# Patient Record
Sex: FEMALE | Race: WHITE
[De-identification: names, ages, dates, MRNs, and addresses within clinical notes are randomized per-mention and may not be internally consistent; named-entity substitution may affect disease eponyms.]

---

## 2017-02-20 ENCOUNTER — HOSPITAL ENCOUNTER (OUTPATIENT)
Dept: HOSPITAL 45 - C.LABBC | Age: 67
Discharge: HOME | End: 2017-02-20
Attending: INTERNAL MEDICINE
Payer: COMMERCIAL

## 2017-02-20 DIAGNOSIS — R73.01: ICD-10-CM

## 2017-02-20 DIAGNOSIS — Z51.81: Primary | ICD-10-CM

## 2017-02-20 DIAGNOSIS — Z79.01: ICD-10-CM

## 2017-02-20 LAB
ALBUMIN/GLOB SERPL: 0.9 {RATIO} (ref 0.9–2)
ALP SERPL-CCNC: 59 U/L (ref 45–117)
ALT SERPL-CCNC: 26 U/L (ref 12–78)
ANION GAP SERPL CALC-SCNC: 8 MMOL/L (ref 3–11)
AST SERPL-CCNC: 16 U/L (ref 15–37)
BUN SERPL-MCNC: 21 MG/DL (ref 7–18)
BUN/CREAT SERPL: 34 (ref 10–20)
CALCIUM SERPL-MCNC: 9 MG/DL (ref 8.5–10.1)
CHLORIDE SERPL-SCNC: 105 MMOL/L (ref 98–107)
CHOLEST/HDLC SERPL: 2.3 {RATIO}
CO2 SERPL-SCNC: 29 MMOL/L (ref 21–32)
CREAT SERPL-MCNC: 0.63 MG/DL (ref 0.6–1.2)
EST. AVERAGE GLUCOSE BLD GHB EST-MCNC: 120 MG/DL
GLOBULIN SER-MCNC: 3.9 GM/DL (ref 2.5–4)
GLUCOSE SERPL-MCNC: 89 MG/DL (ref 70–99)
GLUCOSE UR QL: 77 MG/DL
KETONES UR QL STRIP: 87 MG/DL
NITRITE UR QL STRIP: 77 MG/DL (ref 0–150)
PH UR: 179 MG/DL (ref 0–200)
POTASSIUM SERPL-SCNC: 4.3 MMOL/L (ref 3.5–5.1)
SODIUM SERPL-SCNC: 142 MMOL/L (ref 136–145)
TSH SERPL-ACNC: 2.43 UIU/ML (ref 0.3–4.5)
VERY LOW DENSITY LIPOPROT CALC: 15 MG/DL

## 2017-02-22 ENCOUNTER — HOSPITAL ENCOUNTER (OUTPATIENT)
Dept: HOSPITAL 45 - C.PAPS | Age: 67
Discharge: HOME | End: 2017-02-22
Attending: INTERNAL MEDICINE
Payer: COMMERCIAL

## 2017-02-22 DIAGNOSIS — Z01.419: Primary | ICD-10-CM

## 2017-02-22 DIAGNOSIS — N95.2: ICD-10-CM

## 2017-02-24 NOTE — CODING QUERY MEDICAL NECESSITY
SUPPORTING DIAGNOSIS NEEDED



Dr. Arellano,



A supporting diagnosis is required for the test/procedure performed on this patient in 
order for us to be reimbursed by the patient's insurance. Please provide a supporting 
diagnosis for the following test/procedure listed below next to the test name along with 
your signature. 



*If there is no additional diagnosis for this patient that would support the following 
test/procedure please document that below next to the test/procedure.



Test(s)/Procedure(s) that require a supporting diagnosis:





* [id number] GLYCATED HEMOGLOBIN            DIAGNOSIS:





***DATE OF SERVICE: 2/20/17***





Provider Signature:  ______________________________  Date:  _______



Thank you  

Jose Mendoza

[facility] Information Management

Phone:  [phone]

Fax:  [phone]



Once completed, please kindly fax back to [phone]



For questions please call [phone]

## 2017-03-02 ENCOUNTER — HOSPITAL ENCOUNTER (OUTPATIENT)
Dept: HOSPITAL 45 - C.LAB | Age: 67
Discharge: HOME | End: 2017-03-02
Attending: INTERNAL MEDICINE
Payer: COMMERCIAL

## 2017-03-02 DIAGNOSIS — Z11.59: Primary | ICD-10-CM

## 2017-03-09 ENCOUNTER — HOSPITAL ENCOUNTER (OUTPATIENT)
Dept: HOSPITAL 45 - C.MAMM | Age: 67
Discharge: HOME | End: 2017-03-09
Attending: INTERNAL MEDICINE
Payer: COMMERCIAL

## 2017-03-09 DIAGNOSIS — M85.851: ICD-10-CM

## 2017-03-09 DIAGNOSIS — Z00.00: Primary | ICD-10-CM

## 2017-06-07 ENCOUNTER — HOSPITAL ENCOUNTER (OUTPATIENT)
Dept: HOSPITAL 45 - C.RADBC | Age: 67
Discharge: HOME | End: 2017-06-07
Attending: INTERNAL MEDICINE
Payer: COMMERCIAL

## 2017-06-07 DIAGNOSIS — J20.9: Primary | ICD-10-CM

## 2017-06-07 NOTE — DIAGNOSTIC IMAGING REPORT
TWO VIEW CHEST



CLINICAL HISTORY: Acute bronchitis.



FINDINGS: PA and lateral chest radiographs are compared to study dated

11/13/2013. The cardiomediastinal silhouette is unremarkable.  There is

atherosclerotic calcification of the thoracic aorta. There is fullness of the

right hilum. Chronic interstitial thickening is unchanged. No airspace

consolidation or pleural effusion is identified. There is no pneumothorax. The

skeletal structures are osteopenic. Degenerative change and mild scoliosis are

noted in the thoracic spine.



IMPRESSION: 



1. There is no airspace consolidation or pleural effusion.



2. There is fullness of the right hilum. This likely represents a prominent

pulmonary artery. Correlation with a contrast-enhanced chest CT is recommended

for further interrogation and to rule out underlying mass or lymphadenopathy.







Electronically signed by:  Omer Maldonado M.D.

6/7/2017 11:35 AM



Dictated Date/Time:  6/7/2017 11:29 AM

## 2017-06-09 ENCOUNTER — HOSPITAL ENCOUNTER (OUTPATIENT)
Dept: HOSPITAL 45 - C.CTS | Age: 67
Discharge: HOME | End: 2017-06-09
Attending: PHYSICIAN ASSISTANT
Payer: COMMERCIAL

## 2017-06-09 DIAGNOSIS — R59.0: ICD-10-CM

## 2017-06-09 DIAGNOSIS — R91.8: Primary | ICD-10-CM

## 2017-06-09 NOTE — DIAGNOSTIC IMAGING REPORT
CT SCAN OF THE CHEST WITH IV CONTRAST



CLINICAL HISTORY: Follow-up abnormal chest x-ray. Fullness of the right hilum.



COMPARISON STUDY:  Chest x-ray dated 6/7/2017.



TECHNIQUE: Following the IV administration of 93 cc of Optiray 320, CT scan of

the thorax was performed from the thoracic inlet to the upper abdomen. Images

are reviewed in the axial, sagittal, and coronal planes. IV contrast was

administered without complication.



CT DOSE: 670.27 mGy.cm



FINDINGS:



Thyroid: Imaged portions of the thyroid gland are normal in size and

attenuation.



Thoracic aorta: There is mild atherosclerotic calcification of the thoracic

aorta, which is normal in caliber and demonstrates standard 3-vessel arch

anatomy. No dissection is seen.



Pulmonary vasculature: The pulmonary trunk is normal in caliber. There are no

filling defects identified in the central pulmonary vessels to indicate

pulmonary embolus. Note that this examination was not protocoled for evaluation

of the pulmonary arteries.



Heart: The heart is enlarged and without pericardial effusion.



Lungs and pleural spaces: There is patchy groundglass consolidation identified

in the superior segment of the right lower lobe. This likely represents a mild

infectious/inflammatory pneumonitis. No pleural effusion is identified. The

lungs are otherwise clear. The trachea and central airways are patent mild

diffuse peribronchial thickening is observed.



Mediastinum: There are prominent mediastinal lymph nodes. A precarinal node on

image #116 measures 10 mm in short axis.



Ramonita: Prominent right hilar lymph nodes measure up to 9 mm in short axis.



Axillae: There is no axillary lymphadenopathy.



Upper abdomen: There is a tiny hiatal hernia. A 1.6 cm cyst is noted in the left

lobe of liver. Additional subcentimeter hepatic hypodensities may also represent

cysts but are 2 small for definitive characterization. There is evidence of

hepatic steatosis.



Skeletal structures: The skeletal structures are osteopenic. There is a mild and

age indeterminant superior end plate compression deformity of T7. Mild

degenerative change is seen in the shoulders and thoracic spine. No lytic or

blastic bony lesions are seen.





IMPRESSION:



1. There is mild patchy groundglass consolidation identified in the superior

segment of the right lower lobe. The appearance is typical for a mild

infectious/inflammatory pneumonitis. Clinical correlation will be required.



2. Mild diffuse peribronchial thickening suggests reactive air disease.



3. Mild cardiomegaly.



4. There are prominent mediastinal and hilar lymph nodes. These are not clearly

pathologically enlarged by size criteria and are likely on a reactive basis.



5. There is no mass or concerning adenopathy identified in the right hilum as

questioned by chest x-ray.







Electronically signed by:  Omer Maldonado M.D.

6/9/2017 2:52 PM



Dictated Date/Time:  6/9/2017 2:41 PM

## 2017-08-23 ENCOUNTER — HOSPITAL ENCOUNTER (INPATIENT)
Dept: HOSPITAL 45 - C.EDA | Age: 67
LOS: 1 days | Discharge: HOME | DRG: 310 | End: 2017-08-24
Attending: HOSPITALIST | Admitting: HOSPITALIST
Payer: COMMERCIAL

## 2017-08-23 VITALS
DIASTOLIC BLOOD PRESSURE: 69 MMHG | TEMPERATURE: 97.7 F | OXYGEN SATURATION: 93 % | HEART RATE: 56 BPM | SYSTOLIC BLOOD PRESSURE: 113 MMHG

## 2017-08-23 VITALS — SYSTOLIC BLOOD PRESSURE: 101 MMHG | HEART RATE: 102 BPM | DIASTOLIC BLOOD PRESSURE: 64 MMHG

## 2017-08-23 VITALS
DIASTOLIC BLOOD PRESSURE: 73 MMHG | HEART RATE: 88 BPM | OXYGEN SATURATION: 94 % | SYSTOLIC BLOOD PRESSURE: 116 MMHG | TEMPERATURE: 97.88 F

## 2017-08-23 VITALS
BODY MASS INDEX: 39.67 KG/M2 | WEIGHT: 238.1 LBS | WEIGHT: 238.1 LBS | HEIGHT: 65 IN | HEIGHT: 65 IN | BODY MASS INDEX: 39.67 KG/M2

## 2017-08-23 VITALS
TEMPERATURE: 97.52 F | DIASTOLIC BLOOD PRESSURE: 73 MMHG | SYSTOLIC BLOOD PRESSURE: 124 MMHG | HEART RATE: 58 BPM | OXYGEN SATURATION: 95 %

## 2017-08-23 VITALS
HEART RATE: 79 BPM | TEMPERATURE: 97.7 F | DIASTOLIC BLOOD PRESSURE: 64 MMHG | OXYGEN SATURATION: 94 % | SYSTOLIC BLOOD PRESSURE: 101 MMHG

## 2017-08-23 VITALS — OXYGEN SATURATION: 92 %

## 2017-08-23 DIAGNOSIS — I10: ICD-10-CM

## 2017-08-23 DIAGNOSIS — I48.0: Primary | ICD-10-CM

## 2017-08-23 DIAGNOSIS — Z79.899: ICD-10-CM

## 2017-08-23 DIAGNOSIS — Z82.49: ICD-10-CM

## 2017-08-23 DIAGNOSIS — F41.8: ICD-10-CM

## 2017-08-23 DIAGNOSIS — Z79.01: ICD-10-CM

## 2017-08-23 LAB
ANION GAP SERPL CALC-SCNC: 5 MMOL/L (ref 3–11)
BASOPHILS # BLD: 0.02 K/UL (ref 0–0.2)
BASOPHILS NFR BLD: 0.3 %
BUN SERPL-MCNC: 15 MG/DL (ref 7–18)
BUN/CREAT SERPL: 20.8 (ref 10–20)
CALCIUM SERPL-MCNC: 9.1 MG/DL (ref 8.5–10.1)
CHLORIDE SERPL-SCNC: 106 MMOL/L (ref 98–107)
CKMB/CK RATIO: 1.1 (ref 0–3)
CO2 SERPL-SCNC: 29 MMOL/L (ref 21–32)
COMPLETE: YES
CREAT CL PREDICTED SERPL C-G-VRATE: 90.6 ML/MIN
CREAT SERPL-MCNC: 0.74 MG/DL (ref 0.6–1.2)
EOSINOPHIL NFR BLD AUTO: 232 K/UL (ref 130–400)
GLUCOSE SERPL-MCNC: 112 MG/DL (ref 70–99)
HCT VFR BLD CALC: 44.6 % (ref 37–47)
IG%: 0.2 %
IMM GRANULOCYTES NFR BLD AUTO: 29.2 %
LYMPHOCYTES # BLD: 1.69 K/UL (ref 1.2–3.4)
MCH RBC QN AUTO: 30.8 PG (ref 25–34)
MCHC RBC AUTO-ENTMCNC: 34.1 G/DL (ref 32–36)
MCV RBC AUTO: 90.3 FL (ref 80–100)
MONOCYTES NFR BLD: 13.3 %
NEUTROPHILS # BLD AUTO: 2.2 %
NEUTROPHILS NFR BLD AUTO: 54.8 %
PMV BLD AUTO: 10.7 FL (ref 7.4–10.4)
POTASSIUM SERPL-SCNC: 3.9 MMOL/L (ref 3.5–5.1)
RBC # BLD AUTO: 4.94 M/UL (ref 4.2–5.4)
SODIUM SERPL-SCNC: 140 MMOL/L (ref 136–145)
WBC # BLD AUTO: 5.78 K/UL (ref 4.8–10.8)

## 2017-08-23 RX ADMIN — DILTIAZEM HYDROCHLORIDE PRN MLS/HR: 5 INJECTION INTRAVENOUS at 10:59

## 2017-08-23 RX ADMIN — DRONEDARONE SCH MG: 400 TABLET, FILM COATED ORAL at 19:32

## 2017-08-23 RX ADMIN — DILTIAZEM HYDROCHLORIDE PRN MLS/HR: 5 INJECTION INTRAVENOUS at 13:01

## 2017-08-23 NOTE — HISTORY & PHYSICAL EXAMINATION
DATE OF ADMISSION:  08/23/2017

 

CHIEF COMPLAINT:  Palpitations and lightheadedness.

 

HISTORY OF PRESENT ILLNESS:  The patient is a very pleasant 66-year-old

female who notes that while this almost never happened, she accidentally

forgot to take her evening meds last night.  She woke up this morning,

feeling palpitations and a little lightheadedness, which is very similar to

what she feels whenever she is in RVR.  She checked her pulse and it was up. 

She took her morning meds, hoping that that would correct this and then she 
would be

doing better.  Unfortunately, she continued to feel dizzy and palpitations. 

She notes at one point with a pulse ox, on her finger, her pulse dropped to

48 and her pulse oximetry down to 81%.  Although, she notes not really

feeling short of breath, maybe mildly so, but more just feeling more

lightheaded at that time; however, she then summoned 911 and was brought to

the ER and has been found in AFib with RVR since.  She denies any chest

pressure.  The shortness of breath is very mild at worst, certainly not

severe at all.  No fevers, chills, or sweats.  No other inciting factors

other than for getting her meds.  She notes that probably in the entirety of

her history of AFib, she has forgotten her meds 4 times, so it is not a

routine thing and generally she has been under good control.

 

REVIEW OF SYSTEMS:  Otherwise negative, except for as above.

 

PAST MEDICAL HISTORY:  Includes AFib and situational anxiety related to

taking care of her sick .  Unfortunately today is in fact her

anniversary.

 

MEDICATIONS:  Fish oil 1000 mg daily, metoprolol succinate 50 mg daily,

Multaq 400 mg b.i.d., probiotic daily, Zoloft 50 mg daily, B12 at 1000 mcg

daily, vitamin C 1000 mg daily, and Xarelto 20 mg daily.

 

PAST SURGICAL HISTORY:  Includes Bartholin gland cyst excision,

tonsillectomy, adenoidectomy and a tubal.

 

FAMILY HISTORY:  Most significant for AFib in mom and sister and MI in her

father.

 

SOCIAL HISTORY:  Very rare alcohol.  She is not a smoker.  She is retired,

 and cares for her .

 

ALLERGIES:  No known drug allergies.

 

PHYSICAL EXAMINATION:

VITAL SIGNS:  Her initial vitals showed a temp of 36.7, pulse in the 109-110

range, respiratory rate 16, blood pressure 151/121, and 92%-94% on room air.

GENERAL:  She is awake, alert, and oriented x3, pleasant, slightly fatigued

appearing, but otherwise in no acute distress.

HEENT:  Normocephalic and atraumatic.  Mucous membranes are moist.

CARDIOVASCULAR:  Tachycardic and irregularly irregular.  Although, actually

even during the time of interview and exam, her heart rate goes from the one

teens to bouncing between 80s to the low 100s.  She shows no rubs, murmurs,

or gallops.

LUNGS:  Faintly coarse at the bases.  Clear elsewhere.  No discrete rales,

rhonchi, or wheezes.  Good effort.  Good air entry.  No accessory muscle use.

ABDOMEN:  Soft, nondistended, and nontender.  No masses or organomegaly.

EXTREMITIES:  Without cyanosis, clubbing or edema.  No calf tenderness.

SKIN:  Shows no rashes.  No pallor or icterus.

NEUROLOGIC:  Shows cranial nerves II-XII to be grossly intact.  Gross motor

and sensory are intact.

MUSCULOSKELETAL:  No gross lesions.

 

LABS AND DIAGNOSTICS:  CBC shows a white count of 5.78, hemoglobin 15.2, and

platelets 232.  Basic metabolic panel with sodium 140, potassium 3.9,

chloride 106, CO2 of 29, BUN 15, creatinine 0.74, calcium 9.1, and glucose

112.  CK total of 146 with an MB of 1.6.  Troponin of less than 0.015.  TSH

of 1.5.  Chest x-ray was read as atherosclerosis of the aortic arch,

prominence of the right hilum.  Previously noted opacities in the superior

segment of the right lower lobe on chest CT from 06/09/2017 are no longer

apparent.  Lung and pleural spaces otherwise clear.  Osseous structures are

normal.  Upper abdomen normal.  To my review, there is maybe mild pulmonary

vascular prominence.  EKG shows atrial fibrillation at 119 without any

significant ischemic changes.

 

ASSESSMENT AND PLAN:

1.  Atrial fibrillation with rapid ventricular response.  This appears to be

incited by accidentally forgetting to take her meds.  Fortunately, it does

not appear that the compliance is  regular issue and her medication regimen

is not very complicated.  At this point, the ER started her on a diltiazem

drip and she is already improving rather dramatically, we plan to transition 
over

to an additional oral metoprolol at this point 12.5 t.i.d. in addition to her

regular 50 mg daily and titrate up or down as needed.  Hopefully, we will be

able to get this under control and hopefully home in the next day or so.  We

will continue her on her Multaq.  If her rates are refractory, certainly we

can utilize IV beta blockers or IV calcium channel blockers again, but it

appears she is going to improve fairly quickly.

2.  Anxiety.  Continue her Zoloft.

3.  Deep venous thrombosis prophylaxis.  She is anticoagulated with Xarelto

for atrial fibrillation.

 

 

[facility]

## 2017-08-23 NOTE — DIAGNOSTIC IMAGING REPORT
CHEST ONE VIEW PORTABLE



CLINICAL HISTORY: 66 years-old Female presenting with Chest Pain. 



TECHNIQUE: Portable upright AP view of the chest was obtained.



COMPARISON:  6/7/2017 and chest CT from 6/9/2017..



FINDINGS:

Atherosclerosis of aortic arch. Prominence of the right hilum. Previously noted

opacities in the superior segment of the right lower lobe on chest CT from

6/9/2017 are not apparent. Lungs and pleural spaces otherwise clear. Osseous

structures normal. Upper abdomen normal.



IMPRESSION:

1.  Prominence of the right hilum could correspond with hilar lymphadenopathy.



2.  Previously noted centrilobular nodular opacities in the superior segment of

the right lower lobe on chest CT from 6/9/2017 are not radiographically

apparent. No new focal infiltrate.







Electronically signed by:  Nikita Walton M.D.

8/23/2017 10:21 AM



Dictated Date/Time:  8/23/2017 10:18 AM

## 2017-08-23 NOTE — EMERGENCY ROOM VISIT NOTE
History


Report prepared by Jake:  Joselyn Farrell


Under the Supervision of:  Dr. Ben Zuniga D.O.


First contact with patient:  09:48


Stated Complaint:  AFIB





History of Present Illness


The patient is a 66 year old female who presents to the Emergency Room with 

complaints of a constant rapid heart rate since she woke up this morning about 

2.5 hours PTA. She laid in bed for an hour and a half because she felt unwell. 

She was also feeling short of breath. At 8:30 she went to take her morning 

medications and noticed that she had forgotten to take all of her medications 

last night. EMS was called and the patient was brought to the ED by ambulance. 

She was found to be in AFIB. Paramedics called for medical command and the 

patient was given 20mg of Cardizem en route. Her symptoms improved with 

Cardizem. The patient does not wear O2 at home. She is not normally in a-fib. 

Pt denies headache, change in vision, fevers, chest pain nausea, vomiting, 

diarrhea, pain with urination, and melena. She takes Xarelto. She has a history 

of AFIB and states that this is typically how she feels when she has an episode 

of AFIB.





   Source of History:  patient, EMS


   Onset:  2.5 hours PTA


   Position:  chest


   Quality:  other (rapid)


   Timing:  resolved


   Modifying Factors (Worsening):  other (medication noncompliance)


   Modifying Factors (Relieving):  other (Cardizem)


   Associated Symptoms:  + SOB, No fevers, No headache, No chest pain, No nausea

, No vomiting, No melena, No diarrhea, No urinary symptoms





Review of Systems


See HPI for pertinent positives & negatives. A total of 10 systems reviewed and 

were otherwise negative.





Past Medical & Surgical


Medical Problems:


(1) Atrial fibrillation


(2) Hypertension








Family History





Hypertension





Social History


Alcohol Use:  none


Drug Use:  none


Marital Status:  single


Housing Status:  lives with family


Occupation Status:  retired





Current/Historical Medications


Scheduled


Ascorbic Acid (Ascorbic Acid), 500 MG PO DAILY


Dronedarone Hcl (Multaq), 1 TAB PO BID


Metoprolol Succ (Toprol Xl) (Toprol-Xl), 50 MG PO DAILY


Rivaroxaban (Xarelto), 20 MG PO DAILY


Thiamine Hcl (Vitamin B-1), 50 MG PO DAILY





Miscellaneous Medications


Saccharomyces Boulardii (Probiotic)





Allergies


Coded Allergies:  


     No Known Allergies (Unverified , 12/29/14)





Physical Exam


Vital Signs











  Date Time  Temp Pulse Resp B/P (MAP) Pulse Ox O2 Delivery O2 Flow Rate FiO2


 


8/23/17 11:55     92 Room Air  


 


8/23/17 11:46  100 16 131/76    


 


8/23/17 11:18  126 20 125/72 93 Room Air  


 


8/23/17 10:52  114 16 141/90 92 Room Air  


 


8/23/17 10:20  103 16 85/66 94 Room Air  


 


8/23/17 09:58 36.7 110 20 151/121 92 Room Air  


 


8/23/17 09:57  109      











Physical Exam


GENERAL: alert, sitting up in bed, disheveled appearing, well nourished, 

minimal distress, non-toxic 


EYE EXAM: normal conjunctiva


OROPHARYNX: no exudate, no erythema, lips, buccal mucosa, and tongue normal and 

mucous membranes are moist


NECK: supple, no nuchal rigidity, no adenopathy, non-tender, no JVD


LUNGS: Clear to auscultation. Normal chest wall mechanics


HEART: Tachycardic, irregularly irregular, systolic ejection murmur, S1 normal 

and S2 normal 


ABDOMEN: abdomen soft, non-tender, normo-active bowel sounds, no masses, no 

rebound or guarding. 


BACK: Back is symmetrical on inspection and there is no deformity, no midline 

tenderness, no CVA tenderness. 


SKIN: no rashes and no bruising 


UPPER EXTREMITIES: upper extremities are grossly normal. 


LOWER EXTREMITIES: No pitting edema. Calves equal bilaterally. 


NEURO EXAM: Normal sensorium, cranial nerves II-XII grossly intact, normal 

speech,  no gross weakness of arms, no gross weakness of legs.





Medical Decision & Procedures


ER Provider


Diagnostic Interpretation:


Radiology results as stated below per my review and the radiologist's 

interpretation: 





CHEST ONE VIEW PORTABLE





CLINICAL HISTORY: 66 years-old Female presenting with Chest Pain. 





TECHNIQUE: Portable upright AP view of the chest was obtained.





COMPARISON:  6/7/2017 and chest CT from 6/9/2017..





FINDINGS:


Atherosclerosis of aortic arch. Prominence of the right hilum. Previously noted


opacities in the superior segment of the right lower lobe on chest CT from


6/9/2017 are not apparent. Lungs and pleural spaces otherwise clear. Osseous


structures normal. Upper abdomen normal.





IMPRESSION:


1.  Prominence of the right hilum could correspond with hilar lymphadenopathy.





2.  Previously noted centrilobular nodular opacities in the superior segment of


the right lower lobe on chest CT from 6/9/2017 are not radiographically


apparent. No new focal infiltrate.











Electronically signed by:  Nikita Walton M.D.


8/23/2017 10:21 AM





Dictated Date/Time:  8/23/2017 10:18 AM





Laboratory Results


8/23/17 10:15








Red Blood Count 4.94, Mean Corpuscular Volume 90.3, Mean Corpuscular Hemoglobin 

30.8, Mean Corpuscular Hemoglobin Concent 34.1, Mean Platelet Volume 10.7, 

Neutrophils (%) (Auto) 54.8, Lymphocytes (%) (Auto) 29.2, Monocytes (%) (Auto) 

13.3, Eosinophils (%) (Auto) 2.2, Basophils (%) (Auto) 0.3, Neutrophils # (Auto

) 3.16, Lymphocytes # (Auto) 1.69, Monocytes # (Auto) 0.77, Eosinophils # (Auto

) 0.13, Basophils # (Auto) 0.02





8/23/17 10:15

















Test


  8/23/17


10:15


 


White Blood Count


  5.78 K/uL


(4.8-10.8)


 


Red Blood Count


  4.94 M/uL


(4.2-5.4)


 


Hemoglobin


  15.2 g/dL


(12.0-16.0)


 


Hematocrit 44.6 % (37-47) 


 


Mean Corpuscular Volume


  90.3 fL


()


 


Mean Corpuscular Hemoglobin


  30.8 pg


(25-34)


 


Mean Corpuscular Hemoglobin


Concent 34.1 g/dl


(32-36)


 


Platelet Count


  232 K/uL


(130-400)


 


Mean Platelet Volume


  10.7 fL


(7.4-10.4)


 


Neutrophils (%) (Auto) 54.8 % 


 


Lymphocytes (%) (Auto) 29.2 % 


 


Monocytes (%) (Auto) 13.3 % 


 


Eosinophils (%) (Auto) 2.2 % 


 


Basophils (%) (Auto) 0.3 % 


 


Neutrophils # (Auto)


  3.16 K/uL


(1.4-6.5)


 


Lymphocytes # (Auto)


  1.69 K/uL


(1.2-3.4)


 


Monocytes # (Auto)


  0.77 K/uL


(0.11-0.59)


 


Eosinophils # (Auto)


  0.13 K/uL


(0-0.5)


 


Basophils # (Auto)


  0.02 K/uL


(0-0.2)


 


RDW Standard Deviation


  45.8 fL


(36.4-46.3)


 


RDW Coefficient of Variation


  13.9 %


(11.5-14.5)


 


Immature Granulocyte % (Auto) 0.2 % 


 


Immature Granulocyte # (Auto)


  0.01 K/uL


(0.00-0.02)


 


Anion Gap


  5.0 mmol/L


(3-11)


 


Est Creatinine Clear Calc


Drug Dose 90.6 ml/min 


 


 


Estimated GFR (


American) 97.2 


 


 


Estimated GFR (Non-


American 83.8 


 


 


BUN/Creatinine Ratio 20.8 (10-20) 


 


Calcium Level


  9.1 mg/dl


(8.5-10.1)


 


Total Creatine Kinase


  146 U/L


()


 


Creatine Kinase MB


  1.6 ng/ml


(0.5-3.6)


 


Creatine Kinase MB Ratio 1.1 (0-3.0) 


 


Troponin I


  < 0.015 ng/ml


(0-0.045)





Laboratory results per my review.





Medications Administered











 Medications


  (Trade)  Dose


 Ordered  Sig/Nevin


 Route  Start Time


 Stop Time Status Last Admin


Dose Admin


 


 Diltiazem HCl 125


 mg/Dextrose  125 ml @ 0


 mls/hr  Q0M PRN


 IV  8/23/17 10:15


 8/23/17 23:59  8/23/17 10:59


5 MLS/HR


 


 Sodium Chloride  1,000 ml @ 


 999 mls/hr  Q1H1M STAT


 IV  8/23/17 10:30


 8/23/17 11:30 DC 8/23/17 11:22


999 MLS/HR


 


 Metoprolol


 Tartrate


  (Lopressor Tab)  25 mg  ONE  ONCE


 PO  8/23/17 12:00


 8/23/17 12:20 DC 8/23/17 12:09


25 MG











ECG


Indication:  SOB/dyspnea


Rate (beats per minute):  119


Rhythm:  atrial fibrillation (RVR)


Findings:  nonspecific-ST abn (Lateral), PVC





ED Course


ED COURSE: 


Vital signs were reviewed and showed tachycardic.


The patients medical record was reviewed


The above diagnostic studies were performed and reviewed.


ED treatments and interventions as stated above. 





0935: I received medical command and ordered 20 mg of Cardizem IV. 





0948: The patient was evaluated in room A9B. A complete history and physical 

examination was performed.





1015: Diltiazem HCl 125 mg/Dextrose IV





1030: NSS 1000 ml @ 999 mls/hr IV 





1052: Upon reevaluation, the patient is resting more comfortably. I discussed 

my findings with the patient and she understands and agrees with the treatment 

plan.   


Based on the patients age, coexisting illnesses, exam and lab findings the 

decision to treat as an inpatient was made.


The patient remained stable while under my care.


The patient will be evaluated for further management.





1106: I reviewed the patient's case with Dr. Wolfe.  [facility] will evaluate the patient for further management.





Medical Decision


Differential diagnoses includes but is not limited to pneumonia, bronchitis, 

COPD/Asthma exacerbation, pneumothorax, pulmonary embolism, congestive heart 

failure, acute coronary syndrome





Patient is a 66-year-old female who forgot to take her medications last night 

that presents the ER in A. fib with RVR.  I took medical command and EKG showed 

A. fib with RVR.  We gave 20 mg IV Cardizem prior to arrival.  Upon arrival 

patient's heart rate was in the 100-120's.  She was placed on a Cardizem drip 

at this time.  Heart rate eventually treated up until 130s to 140s.  Present 

trip was titrated from 5 mg 10 mg.  Heart rate trended back down into the low 

110-120.  Patient was resting comfortably.  Chest x-ray was unremarkable along 

with troponin.  Patient was updated bedside admitted to internal medicine with 

A. fib with RVR on a Cardizem drip being titrated up.





Medication Reconcilliation


Current Medication List:  was personally reviewed by me





Blood Pressure Screening


Patient's blood pressure:  Normal blood pressure





Consults


Time Called:  1103


Consulting Physician:  Dr. Wolfe


Returned Call:  [phone]


I reviewed the patient's case with Dr. Wolfe.  [facility] will evaluate the patient for further management.





Impression





 Primary Impression:  


 Atrial fibrillation with RVR


 Additional Impression:  


 Shortness of breath





Critical Care


I have personally spent 35 minutes of critical care time in the direct 

management of this patient.  This includes bedside care, interpretation of 

diagnostic studies, and testing, discussion with consultants, patient, and 

family members, and other required patient management activities.  This 35 

minutes is in excess of all separately billable procedures.





Scribe Attestation


The scribe's documentation has been prepared under my direction and personally 

reviewed by me in its entirety. I confirm that the note above accurately 

reflects all work, treatment, procedures, and medical decision making performed 

by me.





Departure Information


Dispostion


Being Evaluated By Hospitalist





Referrals


Nikita Arellano M.D. (PCP)





Problem Qualifiers

## 2017-08-23 NOTE — CARDIOLOGY CONSULTATION
DATE OF CONSULTATION:  2017

 

REFERRING PHYSICIAN:  Dr. Ben Wolfe.

 

REASON FOR CONSULTATION:  Paroxysmal atrial fibrillation with rapid

ventricular response.

 

CHIEF COMPLAINT ON ADMISSION:  Lightheadedness and palpitations.

 

HISTORY OF PRESENT ILLNESS:  Ms. Simmons is a 66-year-old female who awoke

this morning with a feeling of generalized unwellness.  She initially checked

her pulse oximeter, which was normal; however, she noted that her heart rate

and pulse ox had declined.  She became somewhat lightheaded and crawled down

her hallway to her 's room.  She reports a minimum heart rate of 48

beats per minute.  EMS was summoned and she was brought to the Emergency

Department.  In the ER, she was found to be in atrial fibrillation with rapid

ventricular response.  The patient initially received 20 mg of intravenous

Cardizem en route to the ER.  She was then started on an intravenous Cardizem

infusion and received an additional 12.5 mg of metoprolol.  Upon transfer

from the Emergency Department to the progressive care unit, the patient

converted to sinus rhythm.  Her initial post-conversion pause was

approximately 3.1 seconds.  She was on an intravenous Cardizem infusion at

that point.  She then developed a subsequent 3-second and a 4-second pause. 

There were no associated symptoms.  Her intravenous Cardizem infusion was

subsequently discontinued.  The patient is currently feeling much better and

back to her baseline.

 

The patient admits to missing her dose of Multaq last evening.  In general,

she has been compliant with medications.  She has been followed by an

electrophysiologist at [facility] in [place].  Previously followed by the [facility].  She is unknown to the [facility] from

a cardiovascular perspective.

 

REVIEW OF SYSTEMS:  The pertinent positive noted above, a comprehensive

10-system review is otherwise negative.

 

PAST MEDICAL HISTORY:

1.  Paroxysmal atrial fibrillation, previously treated with "pill in the

pocket" regimen including flecainide 300 mg as needed.

2.  Hypertension.

 

FAMILY HISTORY:  Significant for father with heart disease at age 82, brother

 at age 49 for coronary artery disease, father with MI at 51, and sister

with atrial fibrillation and hypertension.

 

SOCIAL HISTORY:  Lifelong nonsmoker.  She is  and retired.  She states

she exercises regularly.

 

ALLERGIES:  No known drug allergies.

 

CURRENT OUTPATIENT MEDICATIONS:

1.  Ascorbic acid 500 mg daily.

2.  Multaq 400 mg twice daily.

3.  Toprol-XL 50 mg daily.

4.  Xarelto 20 mg daily.

5.  Thiamine 50 mg daily.

6.  Probiotic daily.

 

ECG ON ADMISSION:  Atrial fibrillation, occasional PVCs versus aberrantly

conducted complexes, ventricular rate at 119 beats per minute, nonspecific ST

abnormality.

 

CHEST X-RAY ON ADMISSION:  Prominence of the right hilum, no new focal

infiltrate.

 

Telemetry monitor currently demonstrates sinus rhythm.  No recurrent pauses

since discontinuation of intravenous Cardizem.

 

LABORATORY DATA:  White blood cell count 5.78, hemoglobin is 15.2.

 

Sodium is 140, potassium 3.9, chloride 106, CO2 is 29, BUN is 15, and

creatinine is 0.74.

 

PHYSICAL EXAMINATION:

VITAL SIGNS:  Temperature is 36.5 degrees centigrade, pulse 79 beats per

minute and regular, respiratory rate is 18 breaths per minute, blood pressure

101/64 and SaO2 is 94% on room air.

GENERAL:  NAD, awake, alert and oriented x3.

HEENT:  Mucous membranes are moist.  No scleral icterus.  The conjunctivae

are pink.

NECK:  Supple.  There is no JVD, no HJR, and no carotid bruit.

HEART:  Regular with a normal S1 and S2.  There is no murmur, rub, or gallop.

LUNGS:  Clear without rales, rhonchi or wheeze.

ABDOMEN:  Soft and nontender.  There is no rebound or guarding.  Normal bowel

sounds.

EXTREMITIES:  Demonstrate no clubbing, cyanosis, or edema.

NEUROLOGIC:  Demonstrates no focal deficit.

 

FINAL IMPRESSION:

1.  Paroxysmal atrial fibrillation with CHADS-VASc score of 2.  Episode of

rapid atrial fibrillation likely precipitated by noncompliance with

antiarrhythmic therapy.  The patient subsequently converted to sinus rhythm

with a 3.1-second post-conversion pause recorded.  They were then

approximately three  3-4 second pauses post-conversion likely attributed to

intravenous Cardizem infusion.

2.  Hypertension -- controlled.

3.  Previously failed therapy with flecainide.

4.  Chronic anticoagulation with Xarelto.

 

PLAN AND RECOMMENDATIONS:  Intravenous Cardizem has been discontinued.  I

will continue current antiarrhythmic therapy with Multaq at

this time.  She will also continue metoprolol 50 mg daily.  Observe

telemetry overnight for any recurrent significant bradycardia, pauses or

heart block.  Xarelto will be continued for chronic anticoagulation.  I will

continue to follow during hospitalization.

 

Thank you for allowing me to take part in the care of your patient.

 

 

 

HAKEEM

## 2017-08-24 VITALS
SYSTOLIC BLOOD PRESSURE: 108 MMHG | DIASTOLIC BLOOD PRESSURE: 68 MMHG | HEART RATE: 65 BPM | TEMPERATURE: 97.52 F | OXYGEN SATURATION: 93 %

## 2017-08-24 VITALS
TEMPERATURE: 97.88 F | SYSTOLIC BLOOD PRESSURE: 108 MMHG | HEART RATE: 60 BPM | DIASTOLIC BLOOD PRESSURE: 72 MMHG | OXYGEN SATURATION: 92 %

## 2017-08-24 VITALS
SYSTOLIC BLOOD PRESSURE: 101 MMHG | TEMPERATURE: 98.24 F | OXYGEN SATURATION: 98 % | DIASTOLIC BLOOD PRESSURE: 62 MMHG | HEART RATE: 67 BPM

## 2017-08-24 VITALS
SYSTOLIC BLOOD PRESSURE: 108 MMHG | HEART RATE: 60 BPM | TEMPERATURE: 97.88 F | OXYGEN SATURATION: 98 % | DIASTOLIC BLOOD PRESSURE: 72 MMHG

## 2017-08-24 RX ADMIN — DRONEDARONE SCH MG: 400 TABLET, FILM COATED ORAL at 07:49

## 2017-08-24 NOTE — CARDIOLOGY PROGRESS NOTE
DATE: 08/24/2017

 

DATE:  08/24/2017  

 

SUBJECTIVE:  The patient is seen and examined at the bedside.  Feeling well

this morning.  No recurrent atrial fibrillation overnight.  There were no

significant pauses or symptomatic bradycardia recorded.  Occasional premature

ventricular complexes noted on telemetry.  Denies chest pain or shortness of

breath.  Resting comfortably.  No medication changes were made.  Offers no

complaints at this time.

 

REVIEW OF SYSTEMS:  The pertinent positive noted above.  A 3 system review

including cardiovascular, pulmonary, gastroenterologic systems otherwise

negative.

 

LABORATORY DATA:  No repeat labs were performed this morning.

 

INPATIENT MEDICATIONS:  Reviewed.

 

PHYSICAL EXAMINATION:

VITAL SIGNS:  Temperature is 36.8 degrees Celsius, pulse 67 beats per minute

and regular, respiratory rate is 18 breaths per minute, blood pressure

101/62, SAO2 is 98% on room air.

GENERAL:  NAD, overweight, awake, alert and oriented x3.

HEAD, EYES, EARS, NOSE, AND THROAT:  Mucous membranes moist.  No scleral

icterus.  Conjunctivae pink.

NECK:  Supple.  There is no JVD, no HJR, no carotid bruit.

HEART:  Regular with a normal S1 and S2.  There is a 1/6 systolic ejection

murmur heard best at the right second intercostal space without radiation.

LUNGS:  Clear without rales, rhonchi or wheeze.

ABDOMEN:  Soft and nontender.  No rebound or guarding.  Normal bowel sounds.

EXTREMITIES:  Warm and dry.  There is no clubbing, cyanosis, or edema.

NEUROLOGIC EXAMINATION:  Demonstrates no focal motor deficit.  Cranial nerves

grossly intact.

 

FINAL IMPRESSION:

1. Paroxysmal atrial fibrillation with rapid ventricular response, status

post spontaneous conversion to normal sinus rhythm.  A 3.1 second

post-conversion pause noted by two 3-4 second sinus pauses post-conversion. 

There has been no recurrent bradycardia or pauses overnight.  The pauses

occurred in the setting of intravenous Cardizem infusion.  Of note, the

patient also reported lightheadedness associated with bradycardia prior to

admission.  These findings and symptoms suggest early  tachybrady syndrome.

2. Soft systolic ejection murmur.

3. Hypertension -- controlled.

4. Chronic anticoagulation with Xarelto.

5. Previously failed antiarrhythmic therapy with flecainide.

 

PLAN AND RECOMMENDATIONS:  The patient will continue Multaq plus metoprolol

as previously ordered.  Xarelto will be continued for anticoagulation at this

time.  Recommend 14 to 30-day outpatient cardiac monitor to assess atrial

fibrillation burden as well as borderline tachybrady syndrome.  The patient

currently follows with a cardiologist at [facility] in [place].  She is

considering establishing with local cardiology.  She will discuss further

with her family members.  I also discussed her hospitalization and plan of

care with her son-in-law, Dr. Rian Ojeda who is a cardiologist.  All

questions were answered to her satisfaction.  She is stable for discharge

from a cardiovascular perspective.

 

Thank you for allowing me to take part in the care of your patient.

## 2017-08-28 NOTE — DISCHARGE SUMMARY
Please see dictated H&P for full details of presentation.

 

Briefly, the patient is a 66-year-old with history of paroxysmal atrial

fibrillation who presented with palpitations and lightheadedness.  She was

discovered to have atrial fibrillation with rapid ventricular response and

she was brought in to the hospital for further treatment.  She was started on

diltiazem drip which led to some 3- to 4-second sinus pauses.  She

spontaneously converted to normal sinus rhythm, pauses occurred during

Cardizem infusion.  She was seen in consultation by Dr. Humphrey of

cardiology.  She was asymptomatic and in normal sinus rhythm.  He felt she

had paroxysmal atrial fibrillation and possible signs of clinical tachybrady

syndrome.  He recommended that she continue with Multaq and metoprolol with

Xarelto for anticoagulation and a Holter monitor be assessed to determine her

atrial fibrillation burden.  The above was discussed with the patient and she

was in agreement with the therapy.  She will follow up with cardiology in 1

week's time.

 

MEDICATIONS ON DISCHARGE:  Multaq 400 mg twice a day, metoprolol 50 mg daily

XL, Xarelto 20 mg daily, probiotics and thiamine 50 mg daily.

 

CBC and chemistry within normal limits.  Chest x-ray:  Prominent right hilum

____ lymphadenopathy and previously noted central lobular nodular opacities

in superior segment of the right lower lobe.  CAT scan is not

radiographically apparent.

 

Time spent in review of the chart, discussion with the patient on the date of

discharge, 31 minutes.

## 2017-09-08 ENCOUNTER — HOSPITAL ENCOUNTER (EMERGENCY)
Dept: HOSPITAL 45 - C.EDB | Age: 67
Discharge: HOME | End: 2017-09-08
Payer: COMMERCIAL

## 2017-09-08 VITALS
BODY MASS INDEX: 40.22 KG/M2 | WEIGHT: 241.41 LBS | WEIGHT: 241.41 LBS | HEIGHT: 65 IN | HEIGHT: 65 IN | BODY MASS INDEX: 40.22 KG/M2

## 2017-09-08 VITALS — OXYGEN SATURATION: 95 %

## 2017-09-08 VITALS — TEMPERATURE: 97.34 F | OXYGEN SATURATION: 97 %

## 2017-09-08 VITALS — DIASTOLIC BLOOD PRESSURE: 73 MMHG | HEART RATE: 63 BPM | SYSTOLIC BLOOD PRESSURE: 129 MMHG

## 2017-09-08 DIAGNOSIS — I48.91: Primary | ICD-10-CM

## 2017-09-08 DIAGNOSIS — I10: ICD-10-CM

## 2017-09-08 DIAGNOSIS — Z82.49: ICD-10-CM

## 2017-09-08 LAB
ALP SERPL-CCNC: 55 U/L (ref 45–117)
ALT SERPL-CCNC: 25 U/L (ref 12–78)
ANION GAP SERPL CALC-SCNC: 3 MMOL/L (ref 3–11)
AST SERPL-CCNC: 16 U/L (ref 15–37)
BASOPHILS # BLD: 0.03 K/UL (ref 0–0.2)
BASOPHILS NFR BLD: 0.4 %
BUN SERPL-MCNC: 20 MG/DL (ref 7–18)
BUN/CREAT SERPL: 29 (ref 10–20)
CALCIUM SERPL-MCNC: 8.9 MG/DL (ref 8.5–10.1)
CHLORIDE SERPL-SCNC: 108 MMOL/L (ref 98–107)
CKMB/CK RATIO: 1 (ref 0–3)
CO2 SERPL-SCNC: 30 MMOL/L (ref 21–32)
COMPLETE: YES
CREAT CL PREDICTED SERPL C-G-VRATE: 97.4 ML/MIN
CREAT SERPL-MCNC: 0.69 MG/DL (ref 0.6–1.2)
EOSINOPHIL NFR BLD AUTO: 257 K/UL (ref 130–400)
GLUCOSE SERPL-MCNC: 95 MG/DL (ref 70–99)
HCT VFR BLD CALC: 48 % (ref 37–47)
IG%: 0.1 %
IMM GRANULOCYTES NFR BLD AUTO: 32 %
INR PPP: 1.1 (ref 0.9–1.1)
LYMPHOCYTES # BLD: 2.14 K/UL (ref 1.2–3.4)
MCH RBC QN AUTO: 29.7 PG (ref 25–34)
MCHC RBC AUTO-ENTMCNC: 31.9 G/DL (ref 32–36)
MCV RBC AUTO: 93 FL (ref 80–100)
MONOCYTES NFR BLD: 12.1 %
NEUTROPHILS # BLD AUTO: 1.6 %
NEUTROPHILS NFR BLD AUTO: 53.8 %
PMV BLD AUTO: 11.1 FL (ref 7.4–10.4)
POTASSIUM SERPL-SCNC: 4.1 MMOL/L (ref 3.5–5.1)
PROTHROMBIN TIME: 11.5 SECONDS (ref 9–12)
RBC # BLD AUTO: 5.16 M/UL (ref 4.2–5.4)
SODIUM SERPL-SCNC: 141 MMOL/L (ref 136–145)
WBC # BLD AUTO: 6.69 K/UL (ref 4.8–10.8)

## 2017-09-08 NOTE — DIAGNOSTIC IMAGING REPORT
CHEST ONE VIEW PORTABLE



CLINICAL HISTORY: Atypical chest pain. Arrhythmia.    



COMPARISON STUDY:  August 23, 2017



FINDINGS: The heart is normal in size. There is no failure. There is no focal

pulmonary consolidation. Underlying upper lobe emphysema is suspected. There are

no pleural effusions.[ 



IMPRESSION: No active disease in the chest.







Electronically signed by:  Franki Butler M.D.

9/8/2017 12:46 PM



Dictated Date/Time:  9/8/2017 12:45 PM

## 2017-09-08 NOTE — EMERGENCY ROOM VISIT NOTE
History


Report prepared by Jake:  Joselyn Farrell


Under the Supervision of:  Dr. David Vasquez M.D.


First contact with patient:  12:03


Chief Complaint:  IRREGULAR HEARTBEAT


Stated Complaint:  IRREGULAR HEART BEAT, AFIB


Nursing Triage Summary:  


PT PRESENTS TO ED WITH C/O "HEART RACING"  PT STATES HAS HAD EPISODES OF 


DIZZINESS AND SOB SINCE LAST WEEK.  PT IS CURRENTLY WEARING A HOLTER MONITOR.  


PT STATES NOT FEELING WELL SINCE LAST PM.  PT STATES CALLING HER PCP AND WAS 


TOLD TO COME TO ED





History of Present Illness


The patient is a 66 year old female who presents to the Emergency Room with 

complaints of an intermittently irregular heart beat for the past several days. 

The patient has a history of atrial fibrillation. She states that over the past 

few days she has been dizzy and short of breath. She has felt that she has been 

in a-fib intermittently over this period of time. She is currently wearing a 

Holter monitor that was ordered by her PCP.  She called her PCP today and was 

told to come to the ED for further evaluation. The patient is on Xarelto and 

has not missed any doses. She has an appointment with Dr. Ennis in November. 

She denies any nausea, vomiting, diarrhea, and abdominal pain. She has never 

had to be cardioverted for her a-fib before.





   Source of History:  patient


   Onset:  a few days ago


   Position:  chest


   Quality:  other (irregular)


   Timing:  intermittent


   Associated Symptoms:  + SOB, No nausea, No vomiting, No abdominal pain, No 

diarrhea


Note:


Pt notes dizziness.





Review of Systems


See HPI for pertinent positives & negatives. A total of 10 systems reviewed and 

were otherwise negative.





Past Medical & Surgical


Medical Problems:


(1) Atrial fibrillation


(2) Hypertension








Family History





Hypertension





Social History


Smoking Status:  Never Smoker


Alcohol Use:  none


Drug Use:  none


Marital Status:  single


Housing Status:  lives with family


Occupation Status:  retired





Current/Historical Medications


Scheduled


Ascorbic Acid (Ascorbic Acid), 500 MG PO DAILY


Dronedarone Hcl (Multaq), 1 TAB PO BID


Metoprolol Succ (Toprol Xl) (Toprol-Xl), 50 MG PO DAILY


Rivaroxaban (Xarelto), 20 MG PO DAILY


Sertraline (Zoloft), 50 MG PO QPM


Thiamine Hcl (Vitamin B-1), 50 MG PO DAILY





Miscellaneous Medications


Saccharomyces Boulardii (Probiotic)





Allergies


Coded Allergies:  


     No Known Allergies (Unverified , 9/8/17)





Physical Exam


Vital Signs











  Date Time  Temp Pulse Resp B/P (MAP) Pulse Ox O2 Delivery O2 Flow Rate FiO2


 


9/8/17 14:10  63 18 129/73    


 


9/8/17 14:01  64      


 


9/8/17 13:19  134  178/88    


 


9/8/17 13:12  132 18 80/46    


 


9/8/17 12:30     95   


 


9/8/17 12:03  120      


 


9/8/17 11:45 36.3 98 20 126/96 97 Room Air  











Physical Exam


GENERAL: Patient is a healthy-appearing well-nourished 66 year old female


HEAD: Normocephalic atraumatic


EYES: Ocular movements intact pupils equal and react to light


OROPHARYNX mucous membranes are moist no exudates present no erythema or edema 

present


NECK: Supple no nuchal rigidity


CHEST: Good equal expansion


LUNGS: Clear and equal to auscultation


CARDIAC: Irregularly irregular. Normal S1 and S2


ABDOMEN: Soft nontender no guarding


BACK: No CVA tenderness


EXTREMITIES: No pain upon palpation normal muscle strength in all groups no 

clubbing cyanosis or edema


NEURO: Patient is following commands and answering questions appropriately. 

Alert and oriented x3 Cranial Nerves 2-12 grossly intact





Medical Decision & Procedures


ER Provider


Diagnostic Interpretation:


Repeat ECG reveals sinus bradycardia at 59, no ectopy or ischemia. 








Radiology results as stated below per my review and radiologist interpretation:








CHEST ONE VIEW PORTABLE





CLINICAL HISTORY: Atypical chest pain. Arrhythmia.    





COMPARISON STUDY:  August 23, 2017





FINDINGS: The heart is normal in size. There is no failure. There is no focal


pulmonary consolidation. Underlying upper lobe emphysema is suspected. There are


no pleural effusions.[ 





IMPRESSION: No active disease in the chest.











Electronically signed by:  Franki Butler M.D.


9/8/2017 12:46 PM





Dictated Date/Time:  9/8/2017 12:45 PM





Laboratory Results


9/8/17 13:35








Red Blood Count 5.16, Mean Corpuscular Volume 93.0, Mean Corpuscular Hemoglobin 

29.7, Mean Corpuscular Hemoglobin Concent 31.9, Mean Platelet Volume 11.1, 

Neutrophils (%) (Auto) 53.8, Lymphocytes (%) (Auto) 32.0, Monocytes (%) (Auto) 

12.1, Eosinophils (%) (Auto) 1.6, Basophils (%) (Auto) 0.4, Neutrophils # (Auto

) 3.59, Lymphocytes # (Auto) 2.14, Monocytes # (Auto) 0.81, Eosinophils # (Auto

) 0.11, Basophils # (Auto) 0.03





9/8/17 13:35

















Test


  9/8/17


13:35


 


White Blood Count


  6.69 K/uL


(4.8-10.8)


 


Red Blood Count


  5.16 M/uL


(4.2-5.4)


 


Hemoglobin


  15.3 g/dL


(12.0-16.0)


 


Hematocrit 48.0 % (37-47) 


 


Mean Corpuscular Volume


  93.0 fL


()


 


Mean Corpuscular Hemoglobin


  29.7 pg


(25-34)


 


Mean Corpuscular Hemoglobin


Concent 31.9 g/dl


(32-36)


 


Platelet Count


  257 K/uL


(130-400)


 


Mean Platelet Volume


  11.1 fL


(7.4-10.4)


 


Neutrophils (%) (Auto) 53.8 % 


 


Lymphocytes (%) (Auto) 32.0 % 


 


Monocytes (%) (Auto) 12.1 % 


 


Eosinophils (%) (Auto) 1.6 % 


 


Basophils (%) (Auto) 0.4 % 


 


Neutrophils # (Auto)


  3.59 K/uL


(1.4-6.5)


 


Lymphocytes # (Auto)


  2.14 K/uL


(1.2-3.4)


 


Monocytes # (Auto)


  0.81 K/uL


(0.11-0.59)


 


Eosinophils # (Auto)


  0.11 K/uL


(0-0.5)


 


Basophils # (Auto)


  0.03 K/uL


(0-0.2)


 


RDW Standard Deviation


  47.3 fL


(36.4-46.3)


 


RDW Coefficient of Variation


  13.9 %


(11.5-14.5)


 


Immature Granulocyte % (Auto) 0.1 % 


 


Immature Granulocyte # (Auto)


  0.01 K/uL


(0.00-0.02)


 


Prothrombin Time


  11.5 SECONDS


(9.0-12.0)


 


Prothromb Time International


Ratio 1.1 (0.9-1.1) 


 


 


Anion Gap


  3.0 mmol/L


(3-11)


 


Est Creatinine Clear Calc


Drug Dose 97.4 ml/min 


 


 


Estimated GFR (


American) 104.4 


 


 


Estimated GFR (Non-


American 90.1 


 


 


BUN/Creatinine Ratio 29.0 (10-20) 


 


Calcium Level


  8.9 mg/dl


(8.5-10.1)


 


Total Bilirubin


  0.3 mg/dl


(0.2-1)


 


Direct Bilirubin


  < 0.1 mg/dl


(0-0.2)


 


Aspartate Amino Transf


(AST/SGOT) 16 U/L (15-37) 


 


 


Alanine Aminotransferase


(ALT/SGPT) 25 U/L (12-78) 


 


 


Alkaline Phosphatase


  55 U/L


()


 


Total Creatine Kinase


  113 U/L


()


 


Creatine Kinase MB


  1.1 ng/ml


(0.5-3.6)


 


Creatine Kinase MB Ratio 1.0 (0-3.0) 


 


Troponin I


  < 0.015 ng/ml


(0-0.045)


 


Total Protein


  6.8 gm/dl


(6.4-8.2)


 


Albumin


  3.2 gm/dl


(3.4-5.0)


 


Lipase


  135 U/L


()





Labs reviewed by ED physician.





ECG


Indication:  SOB/dyspnea


Rate (beats per minute):  112


Rhythm:  atrial fibrillation


Findings:  no acute ischemic change, other (normal axis)


Comparison ECG Date:  8/23/2017


Change:  no significant change





ED Course


1203: Past medical records reviewed. The patient was evaluated in room B9. A 

complete history and physical examination was performed. 





1410: I discussed the case with Dr. Pendleton of cardiology and he will arrange 

follow-up with Dr. Ennis as an outpatient. 





1413: I reassessed the patient at this time. She has converted out of the a-

fib. She is feeling better and resting comfortably. I discussed the results and 

treatment plan with the patient. I answered all pertaining questions that she 

had. She expressed understanding and verbalized agreement. The patient will be 

discharged home.





Medical Decision


Differential diagnosis:


Etiologies such as cardiac ischemia, aortic dissection, pulmonary embolism, 

pneumonia, pneumothorax, musculoskeletal, infections, pericarditis, myocarditis

, esophageal rupture, gastrointestinal, as well as others were entertained. 





This is a 66-year-old female that presents emergency department complaining of 

atrial fib with RVR.  The patient spontaneously converted in the emergency 

department and had a normal sinus rhythm.  She also has a normal CK-MB 

troponin.  Based on these findings I did consult the patient's cardiologist who 

will try get the patient and with Dr. Ennis ASAP.


Patient was in agreement with the treatment plan.





Medication Reconcilliation


Current Medication List:  was personally reviewed by me





Blood Pressure Screening


Patient's blood pressure:  Normal blood pressure





Consults


Time Called:  1407


Consulting Physician:  Dr. Pendleton


Returned Call:  1410


I discussed the case with Dr. Pendleton of cardiology and he will arrange follow

-up with Dr. Ennis as an outpatient.





Impression





 Primary Impression:  


 Atrial fibrillation with RVR





Scribe Attestation


The scribe's documentation has been prepared under my direction and personally 

reviewed by me in its entirety. I confirm that the note above accurately 

reflects all work, treatment, procedures, and medical decision making performed 

by me.





Departure Information


Dispostion


Home / Self-Care





Referrals


No Doctor, Assigned (PCP)








Nikita Arellano M.D. Nydegger, Charles C M.D.





Forms


HOME CARE DOCUMENTATION FORM,                                                 

               IMPORTANT VISIT INFORMATION





Patient Instructions


Atrial Fibrillation Dc, ED Afib, [facility]





Additional Instructions





Follow up with DR Ennis's office











You have been examined and treated today on an emergency basis only. This is 

not a substitute for, or an effort to provide, complete comprehensive medical 

care. It is impossible to recognize and treat all injuries or illnesses in a 

single emergency department visit. It is therefore important that you follow up 

closely with Dr Arellano.  Call as soon as possible for an appointment.  





Thank you for your time and consideration.  I look forward to speaking with you 

again soon.  Please don't hesitate to call us if you have any questions.

## 2017-12-27 ENCOUNTER — HOSPITAL ENCOUNTER (OUTPATIENT)
Dept: HOSPITAL 45 - C.MAMM | Age: 67
Discharge: HOME | End: 2017-12-27
Attending: INTERNAL MEDICINE
Payer: COMMERCIAL

## 2017-12-27 DIAGNOSIS — Z12.31: Primary | ICD-10-CM

## 2017-12-28 NOTE — MAMMOGRAPHY REPORT
BILATERAL DIGITAL SCREENING MAMMOGRAM TOMOSYNTHESIS WITH CAD: 12/27/2017

CLINICAL HISTORY: Routine screening examination.  





TECHNIQUE:  Breast tomosynthesis in addition to standard 2D mammography was performed. Current study 
was also evaluated with a Computer Aided Detection (CAD) system.  



COMPARISON: Comparison is made to exams dated:  12/22/2016 mammogram, 11/25/2015 mammogram, 9/6/2013 
mammogram - [facility], 7/9/2012 mammogram, 6/8/2011 mammogram, and 6/2/2010 mammog
jens.   



BREAST COMPOSITION:  The tissue of both breasts is almost entirely fatty.  



FINDINGS: There are stable subareolar asymmetries bilaterally, and associated benign-appearing microc
alcifications, most likely representing benign duct ectasia.  No suspicious mass, architectural disto
rtion or cluster of suspicious microcalcifications is seen.  



IMPRESSION:  ACR BI-RADS CATEGORY 1: NEGATIVE

There is no mammographic evidence of malignancy. A 1 year screening mammogram is recommended.  The pa
tient will receive written notification of the results.  





Approximately 10% of breast cancers are not detected with mammography. A negative mammographic report
 should not delay biopsy if a clinically suggestive mass is present.



Maura Solomon M.D.          

ay/:12/27/2017 16:08:04  



Imaging Technologist: Kori RG(R)(KATHIE), [facility]

letter sent: Normal 1/2  

BI-RADS Code: ACR BI-RADS Category 1: Negative

## 2018-05-11 ENCOUNTER — HOSPITAL ENCOUNTER (OUTPATIENT)
Dept: HOSPITAL 45 - C.CTS | Age: 68
Discharge: HOME | End: 2018-05-11
Attending: INTERNAL MEDICINE
Payer: COMMERCIAL
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
## 2018-05-11 DIAGNOSIS — S09.90XA: Primary | ICD-10-CM

## 2018-05-11 NOTE — DIAGNOSTIC IMAGING REPORT
CT HEAD WITHOUT CONTRAST (CT)



CLINICAL HISTORY: Head pain.] Injury.    



COMPARISON STUDY:  No previous studies for comparison.



TECHNIQUE:  Axial CT of the brain is performed from the vertex to the skull

base. IV contrast was not administered for this examination. A dose lowering

technique was utilized adhering to the principles of ALARA.

 



CT DOSE: 788.63 mGycm



FINDINGS:



No intra or extra-axial mass lesions are visualized. There is no CT evidence of

acute cortical infarction. There is no evidence of midline shift. There is no

acute  hemorrhage. No calvarial fractures are visualized. 

There are patchy white matter hypodensities likely on a small vessel basis.

There is no evidence of pathologic ventricular dilatation.

There is no evidence of acute sinusitis



IMPRESSION: No acute intracranial findings







Electronically signed by:  Franki Butler M.D.

5/11/2018 2:29 PM



Dictated Date/Time:  5/11/2018 2:28 PM

## 2018-05-11 NOTE — DIAGNOSTIC IMAGING REPORT
CT FACIAL BONES-MXILLOFAC WITHOUT



CT DOSE: 608.20 mGycm



CLINICAL HISTORY: Facial pain status post trauma    



COMPARISON STUDY:  No previous studies for comparison.



TECHNIQUE: Helical images were acquired in the transverse plane. The study was

reviewed and analyzed on the independent 3-D workstation.  A dose lowering

technique was utilized adhering to the principles of ALARA.





The pterygoid plates appear intact.



The zygomatic arches appear intact.



The globes appear intact. There is no evidence of orbital emphysema.



The orbital walls and floor appear intact.



The mandibular condyles appear intact. There are nondisplaced nasal bone

fractures. There is a fracture of the inferior nasal spine.



There is a tiny left mastoid effusion







IMPRESSION: 

1. Nondisplaced nasal bone fractures, and fracture of the inferior nasal spine

2. No additional facial fractures identified







Electronically signed by:  Franki Butler M.D.

5/11/2018 2:36 PM



Dictated Date/Time:  5/11/2018 2:32 PM

## 2018-08-15 ENCOUNTER — HOSPITAL ENCOUNTER (OUTPATIENT)
Dept: HOSPITAL 45 - C.GI | Age: 68
Discharge: HOME | End: 2018-08-15
Attending: INTERNAL MEDICINE
Payer: COMMERCIAL

## 2018-08-15 VITALS
BODY MASS INDEX: 40.07 KG/M2 | WEIGHT: 240.5 LBS | HEIGHT: 65 IN | HEIGHT: 65 IN | BODY MASS INDEX: 40.07 KG/M2 | WEIGHT: 240.5 LBS

## 2018-08-15 VITALS — OXYGEN SATURATION: 96 % | DIASTOLIC BLOOD PRESSURE: 54 MMHG | SYSTOLIC BLOOD PRESSURE: 134 MMHG | HEART RATE: 62 BPM

## 2018-08-15 DIAGNOSIS — I48.0: ICD-10-CM

## 2018-08-15 DIAGNOSIS — Z80.0: ICD-10-CM

## 2018-08-15 DIAGNOSIS — K57.30: ICD-10-CM

## 2018-08-15 DIAGNOSIS — K55.039: ICD-10-CM

## 2018-08-15 DIAGNOSIS — Z79.01: ICD-10-CM

## 2018-08-15 DIAGNOSIS — K64.8: ICD-10-CM

## 2018-08-15 DIAGNOSIS — Z12.11: Primary | ICD-10-CM

## 2018-08-15 NOTE — DISCHARGE INSTRUCTIONS
Endoscopy Patient Instructions


Date / Procedure(s) Performed


Aug 15, 2018.


Colonoscopy





Allergy Information


Coded Allergies:  


     No Known Allergies (Unverified , 8/15/18)





Discharge Date / Findings


Aug 15, 2018.


Right sided non-specific colitis s/p biopsies


Diverticulosis


Internal hemorrhoids





Medication Instructions


OK to resume all medications today as prescribed





Reported Home Medications








 Medications  Dose


 Route/Sig


 Max Daily Dose Days Date Category


 


 Propafenone Hcl


 Er (Propafenone


 Hcl) 325 Mg Cap  1 Tab


 PO BID


    7/11/18 Reported


 


 Clobetasol


 Propionate 45


 Appln/15 Gm Oint  1 Appln


 TOP BID


    7/11/18 Reported


 


 Calcium 500


  (Calcium-Magnesium


 W/ Vitamin D) 1


 Tab Tab  2.5 Tab


 PO DAILY


    7/11/18 Reported


 


 Krill Oil Omega-3


  (Krill Oil) 1 Cap


 Cap  350 Mg


 PO DAILY


    7/11/18 Reported


 


 Hair/Skin/Nails


  (Multiple


 Vitamins W/


 Minerals) 1 Tab


 Tab  2 Tab


 PO DAILY


    7/11/18 Reported


 


 Vitamin D


  (Cholecalciferol)


 2,000 Unit Tab  1 Tab


 PO DAILY


    7/11/18 Reported


 


 Vitamin B-12


  (Cyanocobalamin)


 1,000 Mcg Tab  1,000 Mcg


 PO DAILY


    7/11/18 Reported


 


 Chewable Vitamin


 C (Ascorbic Acid)


 500 Mg Chw  1 Tab


 PO DAILY


    7/11/18 Reported


 


 Zoloft


  (Sertraline HCl)


 50 Mg Tab  50 Mg


 PO QPM


    9/8/17 Reported


 


 Xarelto


  (Rivaroxaban) 20


 Mg Tab  20 Mg


 PO DAILY


    11/13/13 Rx


 


 Toprol-Xl


  (Metoprolol


 Succinate) 50 Mg


 Tabcr  50 Mg


 PO DAILY


    11/13/13 Reported











Provider Instructions





Activity Restrictions





-  No exercising or heavy lifting for 24 hours. 


-  Do not drink alcohol the day of the procedure.


-  Do not drive a car or operate machinery until the day after the procedure.


-  Do not make any important decisions or sign important papers in 24 hours 

after the procedure.





Following Day:





-  Return to full activity which may include returning to work/school.





Diet





Start your diet with liquids and light foods (jello, soup, juice, toast).  Then 

eat your usual diet if not nauseated.





Treatment For Common After Affects





For mild abdominal pain, bloating, or excessive gas:





-  Rest


-  Eat lightly


-  Lie on right side





Follow-Up Information


Follow-up with Dr. Nikita Arellano as scheduled





Anesthesia Information





What You Should Know





You have had a procedure that required some medicine to reduce anxiety and 

discomfort. This treatment is called moderate sedation.  


After receiving the treatment, you may be sleepy, but you will be able to 

breathe on your own.  The effects of the treatment may last for several hours.








Follow these instructions along with Activity/Diet recommendations noted above:





*  Do NOT do anything where dizziness or clumsiness would be dangerous.





*  Rest quietly at home today, then you can be up and about tomorrow.





*  Have a responsible person stay with you the rest of today.





*  You may have had an I.V. today.  If so, you may take the dressing off later 

today.





Recommendations


 


Call your doctor if:





*  Trouble breathing 





*  Continuous vomiting for more than 24 hours








*  Temperature above 101 degrees





*  Severe abdominal pain or bloating





*  Pain not relieved by pain medicine ordered





*  There is increased drainage or redness from any incision





*  A large amount of rectal bleeding greater than 2-3 tablespoons. 


   (If you had a polyp/s removed or have hemorrhoids, a small amount of blood -


    from the rectum is to be expected.)





*  You have any unanswered questions or concerns.








IN THE EVENT OF A SERIOUS EMERGENCY, GO TO THE NEAREST EMERGENCY ROOM








       Your discharge instructions were prepared by provider Luis Alberto Fuller.





 Patient Instructions Signature Page














Caty Simmons 











Patient (or Guardian) Signature/Date:____________________________________ I 

have read and understand the instructions given to me by my caregivers.








Caregiver/RN/Doctor Signature/Date:____________________________________











The above-named patient and/or guardian has received patient instructions on 

this date.





























+  Original Patient Signature Page (only) stays with chart.  Please make copy 

for patient.

## 2018-08-15 NOTE — ANESTHESIOLOGY PROGRESS NOTE
Anesthesia Post Op Note


Date & Time


Aug 15, 2018 at 15:35





Vital Signs


Pain Intensity:  0





Vital Signs Past 12 Hours








  Date Time  Temp Pulse Resp B/P (MAP) Pulse Ox O2 Delivery O2 Flow Rate FiO2


 


8/15/18 15:23  55 18 111/44 (66) 95 Room Air  


 


8/15/18 14:17 36.6 57 18 148/74 (98) 94 Room Air  











Notes


Mental Status:  alert / awake / arousable, participated in evaluation


Pt Amnestic to Procedure:  Yes


Nausea / Vomiting:  adequately controlled


Pain:  adequately controlled


Airway Patency, RR, SpO2:  stable & adequate


BP & HR:  stable & adequate


Hydration State:  stable & adequate


Anesthetic Complications:  no major complications apparent

## 2018-08-15 NOTE — GI REPORT
Patient Name: Caty Simmons

Procedure Date: 8/15/2018 2:50 PM

MRN: [id number]

Account Number: [id number]

YOB: 1950

Admit Type: Outpatient

Age: 67

Gender: Female

Attending MD: Luis Alberto Fuller DO

Procedure:            Colonoscopy

Providers:            Luis Alberto Fuller DO

Referring MD:         Nikita Arellano

Indications:          Screening for colorectal malignant neoplasm

Medicines:            Monitored Anesthesia Care

Complications:        No immediate complications.

Estimated Blood Loss: Estimated blood loss: none.

Procedure:            Pre-Anesthesia Assessment:

                      - Prior to the procedure, a History and Physical was 

                      performed, and patient medications and allergies were 

                      reviewed. The patient's tolerance of previous 

                      anesthesia was also reviewed. The risks and benefits of 

                      the procedure and the sedation options and risks were 

                      discussed with the patient. All questions were 

                      answered, and informed consent was obtained. Prior 

                      Anticoagulants: The patient has taken Xarelto 

                      (rivaroxaban), last dose was 3 days prior to procedure. 

                      ASA Grade Assessment: II - A patient with mild systemic 

                      disease. After reviewing the risks and benefits, the 

                      patient was deemed in satisfactory condition to undergo 

                      the procedure.

                      After I obtained informed consent, the scope was passed 

                      under direct vision. Throughout the procedure, the 

                      patient's blood pressure, pulse, and oxygen saturations 

                      were monitored continuously. The scope was introduced 

                      through the anus and advanced to the terminal ileum. 

                      The colonoscopy was performed without difficulty. The 

                      patient tolerated the procedure well. The quality of 

                      the bowel preparation was good. The terminal ileum, 

                      ileocecal valve, appendiceal orifice, and rectum were 

                      photographed.

Findings:

     The perianal and digital rectal examinations were normal.

     A localized area of moderately erythematous mucosa was found in the 

     ascending colon. Biopsies were taken with a cold forceps for histology.

     Multiple small-mouthed diverticula were found in the sigmoid colon.

     Non-bleeding internal hemorrhoids were found during retroflexion. The 

     hemorrhoids were small.

Impression:           - Erythematous mucosa in the ascending colon. Biopsied.

                      - Diverticulosis in the sigmoid colon.

                      - Non-bleeding internal hemorrhoids.

Recommendation:       - Resume previous diet.

                      - Continue present medications.

                      - Repeat colonoscopy for surveillance based on 

                      pathology results.

                      - Return to primary care physician as previously 

                      scheduled.

Luis Alberto Fuller DO

8/15/2018 3:23:47 PM

This report has been signed electronically.

Note Initiated On: 8/15/2018 2:50 PM

Number of Addenda: 0

     I attest to the content of the Intraoperative Record and orders 

     documented therein, exceptions below



{[id number]}

## 2018-08-15 NOTE — ENDO HISTORY AND PHYSICAL
History & Physical


Date of Service:


Aug 15, 2018.


Chief Complaint:


screening


Referring Physician:


Dr. Nikita Arellano


History of Present Illness


68 yo CF who presents for screening colonoscopy.





Past Surgical History


Hx Cardiac Surgery:  No


Hx Internal Defibrillator:  No


Hx Pacemaker:  No


Hx Abdominal Surgery:  Yes (TUBAL LIGATION)


Hx of Implantable Prosthesis:  No


Hx Post-Op Nausea and Vomiting:  No


Hx Cancer Surgery:  No


Hx Thoracic Surgery:  No


Hx Orthopedic:  No


Hx Urinary Tract Surgery:  No





Family History


Colon CA, Esophogeal CA





Social History


Smoking Status:  Never Smoker


Hx Substance Use:  No


Hx Alcohol Use:  Yes (RARELY)





Allergies


Coded Allergies:  


     No Known Allergies (Unverified , 8/15/18)





Current Medications





Reported Home Medications








 Medications  Dose


 Route/Sig


 Max Daily Dose Days Date Category


 


 Propafenone Hcl


 Er (Propafenone


 Hcl) 325 Mg Cap  1 Tab


 PO BID


    7/11/18 Reported


 


 Clobetasol


 Propionate 45


 Appln/15 Gm Oint  1 Appln


 TOP BID


    7/11/18 Reported


 


 Calcium 500


  (Calcium-Magnesium


 W/ Vitamin D) 1


 Tab Tab  2.5 Tab


 PO DAILY


    7/11/18 Reported


 


 Krill Oil Omega-3


  (Krill Oil) 1 Cap


 Cap  350 Mg


 PO DAILY


    7/11/18 Reported


 


 Hair/Skin/Nails


  (Multiple


 Vitamins W/


 Minerals) 1 Tab


 Tab  2 Tab


 PO DAILY


    7/11/18 Reported


 


 Vitamin D


  (Cholecalciferol)


 2,000 Unit Tab  1 Tab


 PO DAILY


    7/11/18 Reported


 


 Vitamin B-12


  (Cyanocobalamin)


 1,000 Mcg Tab  1,000 Mcg


 PO DAILY


    7/11/18 Reported


 


 Chewable Vitamin


 C (Ascorbic Acid)


 500 Mg Chw  1 Tab


 PO DAILY


    7/11/18 Reported


 


 Zoloft


  (Sertraline HCl)


 50 Mg Tab  50 Mg


 PO QPM


    9/8/17 Reported


 


 Xarelto


  (Rivaroxaban) 20


 Mg Tab  20 Mg


 PO DAILY


    11/13/13 Rx


 


 Toprol-Xl


  (Metoprolol


 Succinate) 50 Mg


 Tabcr  50 Mg


 PO DAILY


    11/13/13 Reported











Vital Signs


Weight (Kilograms):  109.09


Height (Feet):  5


Height (Inches):  5











  Date Time  Temp Pulse Resp B/P (MAP) Pulse Ox O2 Delivery O2 Flow Rate FiO2


 


8/15/18 14:17 36.6 57 18 148/74 (98) 94 Room Air  











Physical Exam


General Appearance:  WD/WN, no apparent distress


Respiratory/Chest:  


   Auscultation:  breath sounds normal


Cardiovascular:  


   Heart Auscultation:  RRR


Abdomen:  


   Bowel Sounds:  normal


   Inspection & Palpation:  soft, non-distended, no tenderness, guarding & 

rebound





Assessment and Plan


Assessment:


68 yo CF who presents for screening colonoscopy.








Plan:


Proceed with colonoscopy.